# Patient Record
Sex: MALE | Race: WHITE | URBAN - METROPOLITAN AREA
[De-identification: names, ages, dates, MRNs, and addresses within clinical notes are randomized per-mention and may not be internally consistent; named-entity substitution may affect disease eponyms.]

---

## 2022-03-18 PROBLEM — D75.1 ACQUIRED POLYCYTHEMIA: Status: ACTIVE | Noted: 2021-09-07

## 2022-03-18 PROBLEM — I21.4 NSTEMI (NON-ST ELEVATED MYOCARDIAL INFARCTION) (HCC): Status: ACTIVE | Noted: 2021-09-07

## 2022-03-19 PROBLEM — I25.10 CORONARY ARTERY DISEASE INVOLVING NATIVE CORONARY ARTERY OF NATIVE HEART WITHOUT ANGINA PECTORIS: Status: ACTIVE | Noted: 2021-09-16

## 2022-03-19 PROBLEM — F41.1 GENERALIZED ANXIETY DISORDER: Status: ACTIVE | Noted: 2017-02-22

## 2022-03-19 PROBLEM — K21.9 GERD (GASTROESOPHAGEAL REFLUX DISEASE): Status: ACTIVE | Noted: 2018-07-01

## 2022-03-20 PROBLEM — N52.1 ERECTILE DYSFUNCTION DUE TO DISEASES CLASSIFIED ELSEWHERE: Status: ACTIVE | Noted: 2020-04-27

## 2022-05-04 PROBLEM — N52.8 OTHER MALE ERECTILE DYSFUNCTION: Status: ACTIVE | Noted: 2020-04-27

## 2024-09-09 ENCOUNTER — OFFICE VISIT (OUTPATIENT)
Age: 59
End: 2024-09-09
Payer: COMMERCIAL

## 2024-09-09 VITALS
DIASTOLIC BLOOD PRESSURE: 88 MMHG | HEART RATE: 49 BPM | HEIGHT: 70 IN | BODY MASS INDEX: 27.2 KG/M2 | SYSTOLIC BLOOD PRESSURE: 110 MMHG | WEIGHT: 190 LBS

## 2024-09-09 DIAGNOSIS — Z76.89 ENCOUNTER TO ESTABLISH CARE: Primary | ICD-10-CM

## 2024-09-09 DIAGNOSIS — I25.10 CORONARY ARTERY DISEASE INVOLVING NATIVE CORONARY ARTERY OF NATIVE HEART WITHOUT ANGINA PECTORIS: ICD-10-CM

## 2024-09-09 DIAGNOSIS — R73.03 PREDIABETES: ICD-10-CM

## 2024-09-09 DIAGNOSIS — I25.10 ASCVD (ARTERIOSCLEROTIC CARDIOVASCULAR DISEASE): ICD-10-CM

## 2024-09-09 DIAGNOSIS — E78.00 PURE HYPERCHOLESTEROLEMIA: ICD-10-CM

## 2024-09-09 PROCEDURE — 93000 ELECTROCARDIOGRAM COMPLETE: CPT | Performed by: INTERNAL MEDICINE

## 2024-09-09 PROCEDURE — 99204 OFFICE O/P NEW MOD 45 MIN: CPT | Performed by: INTERNAL MEDICINE

## 2024-09-09 RX ORDER — SILDENAFIL 100 MG/1
TABLET, FILM COATED ORAL
COMMUNITY
End: 2024-09-09 | Stop reason: SDUPTHER

## 2024-09-09 RX ORDER — FAMCICLOVIR 250 MG/1
1 TABLET ORAL 2 TIMES DAILY
COMMUNITY
Start: 2023-04-26

## 2024-09-09 RX ORDER — ALPRAZOLAM 0.5 MG
0.5 TABLET ORAL NIGHTLY PRN
COMMUNITY

## 2024-09-09 RX ORDER — SILDENAFIL 100 MG/1
100 TABLET, FILM COATED ORAL PRN
Qty: 30 TABLET | Refills: 5 | Status: SHIPPED | OUTPATIENT
Start: 2024-09-09

## 2024-09-09 RX ORDER — ASPIRIN 81 MG/1
81 TABLET, CHEWABLE ORAL EVERY OTHER DAY
COMMUNITY
Start: 2021-09-10

## 2024-09-09 RX ORDER — METOPROLOL SUCCINATE 25 MG/1
25 TABLET, EXTENDED RELEASE ORAL 2 TIMES DAILY
Qty: 90 TABLET | Refills: 3 | Status: SHIPPED | OUTPATIENT
Start: 2024-09-09 | End: 2024-09-09 | Stop reason: SDUPTHER

## 2024-09-09 RX ORDER — CLOPIDOGREL BISULFATE 75 MG/1
75 TABLET ORAL DAILY
COMMUNITY

## 2024-09-09 RX ORDER — ATORVASTATIN CALCIUM 80 MG/1
80 TABLET, FILM COATED ORAL DAILY
COMMUNITY
End: 2024-09-09 | Stop reason: SDUPTHER

## 2024-09-09 RX ORDER — METOPROLOL SUCCINATE 25 MG/1
25 TABLET, EXTENDED RELEASE ORAL 2 TIMES DAILY
Qty: 180 TABLET | Refills: 3 | Status: SHIPPED | OUTPATIENT
Start: 2024-09-09

## 2024-09-09 RX ORDER — ATORVASTATIN CALCIUM 80 MG/1
80 TABLET, FILM COATED ORAL DAILY
Qty: 90 TABLET | Refills: 3 | Status: SHIPPED | OUTPATIENT
Start: 2024-09-09

## 2024-09-09 RX ORDER — METOPROLOL SUCCINATE 25 MG/1
25 TABLET, EXTENDED RELEASE ORAL 2 TIMES DAILY
COMMUNITY
End: 2024-09-09 | Stop reason: SDUPTHER

## 2024-09-09 ASSESSMENT — ENCOUNTER SYMPTOMS
EYE PAIN: 0
ABDOMINAL PAIN: 0
NAIL CHANGES: 0
APHONIA: 0
STRIDOR: 0
COUGH: 0

## 2024-09-10 DIAGNOSIS — I25.10 CORONARY ARTERY DISEASE INVOLVING NATIVE CORONARY ARTERY OF NATIVE HEART WITHOUT ANGINA PECTORIS: ICD-10-CM

## 2024-09-10 DIAGNOSIS — R73.03 PREDIABETES: ICD-10-CM

## 2024-09-10 LAB
CHOLEST SERPL-MCNC: 139 MG/DL (ref 0–200)
EST. AVERAGE GLUCOSE BLD GHB EST-MCNC: 134 MG/DL
HBA1C MFR BLD: 6.3 % (ref 0–5.6)
HDLC SERPL-MCNC: 41 MG/DL (ref 40–60)
HDLC SERPL: 3.4 (ref 0–5)
LDLC SERPL CALC-MCNC: 76 MG/DL (ref 0–100)
TRIGL SERPL-MCNC: 110 MG/DL (ref 0–150)
VLDLC SERPL CALC-MCNC: 22 MG/DL (ref 6–23)

## 2024-09-11 LAB — LPA SERPL-SCNC: 66.3 NMOL/L

## 2025-01-20 ENCOUNTER — OFFICE VISIT (OUTPATIENT)
Dept: UROLOGY | Age: 60
End: 2025-01-20
Payer: COMMERCIAL

## 2025-01-20 DIAGNOSIS — Z12.5 ENCOUNTER FOR SCREENING FOR MALIGNANT NEOPLASM OF PROSTATE: Primary | ICD-10-CM

## 2025-01-20 DIAGNOSIS — R35.1 NOCTURIA: ICD-10-CM

## 2025-01-20 DIAGNOSIS — Z12.5 ENCOUNTER FOR SCREENING FOR MALIGNANT NEOPLASM OF PROSTATE: ICD-10-CM

## 2025-01-20 LAB
BILIRUBIN, URINE, POC: NEGATIVE
BLOOD URINE, POC: NEGATIVE
GLUCOSE URINE, POC: NEGATIVE MG/DL
KETONES, URINE, POC: NEGATIVE MG/DL
LEUKOCYTE ESTERASE, URINE, POC: NEGATIVE
NITRITE, URINE, POC: NEGATIVE
PH, URINE, POC: 5.5 (ref 4.6–8)
PROTEIN,URINE, POC: NEGATIVE MG/DL
PVR, POC: 9 CC
SPECIFIC GRAVITY, URINE, POC: 1.01 (ref 1–1.03)
URINALYSIS CLARITY, POC: NORMAL
URINALYSIS COLOR, POC: NORMAL
UROBILINOGEN, POC: NORMAL MG/DL

## 2025-01-20 PROCEDURE — 99204 OFFICE O/P NEW MOD 45 MIN: CPT | Performed by: UROLOGY

## 2025-01-20 PROCEDURE — 51798 US URINE CAPACITY MEASURE: CPT | Performed by: UROLOGY

## 2025-01-20 PROCEDURE — 81003 URINALYSIS AUTO W/O SCOPE: CPT | Performed by: UROLOGY

## 2025-01-20 RX ORDER — LORATADINE 10 MG/1
10 TABLET ORAL DAILY PRN
COMMUNITY

## 2025-01-20 RX ORDER — DEXLANSOPRAZOLE 60 MG/1
CAPSULE, DELAYED RELEASE ORAL
COMMUNITY

## 2025-01-20 ASSESSMENT — ENCOUNTER SYMPTOMS
EYE PAIN: 0
SKIN LESIONS: 0
HEARTBURN: 0
DIARRHEA: 0
COUGH: 0
INDIGESTION: 0
EYE DISCHARGE: 0
BACK PAIN: 0
NAUSEA: 0
WHEEZING: 0
CONSTIPATION: 0
VOMITING: 0
ABDOMINAL PAIN: 0
SHORTNESS OF BREATH: 0
BLOOD IN STOOL: 0

## 2025-01-20 NOTE — PROGRESS NOTES
[facility]  [address] [place] [address]  [phone]          Davidson Huntley  : 1965    Chief Complaint   Patient presents with    New Patient    Encounter for screening for Malignant Neoplasm of Prostate          HPI     Davidson Huntley is a 59 y.o. male    PSA 1.2024  PSA 0.8 2023    The patient is concerned about his PSA having risen over 18 months or so.  He has some mild voiding symptoms for which he desires no therapy.  There is no family history of prostate cancer.  He does tell me that his previous urologist in [place] told him he had a large benign feeling prostate.    Past Medical History:   Diagnosis Date    CAD (coronary artery disease) -    Stent placed    Elevated PSA 09/15/24    .89 (2023) - 1.5 (2024)    High cholesterol      Past Surgical History:   Procedure Laterality Date    CORONARY ANGIOPLASTY WITH STENT PLACEMENT      HERNIA REPAIR  2023    Bilateral inguinal hernia    ROTATOR CUFF REPAIR       Current Outpatient Medications   Medication Sig Dispense Refill    dexlansoprazole (DEXILANT) 60 MG CPDR delayed release capsule 1 capsule 1/2 to 1 hour before morning meal Orally Once a day for 90 days      loratadine (CLARITIN) 10 MG tablet Take 1 tablet by mouth daily as needed      aspirin 81 MG chewable tablet Take 1 tablet by mouth every other day      clopidogrel (PLAVIX) 75 MG tablet Take 1 tablet by mouth daily      famciclovir (FAMVIR) 250 MG tablet Take 1 tablet by mouth 2 times daily      ALPRAZolam (XANAX) 0.5 MG tablet Take 1 tablet by mouth nightly as needed for Sleep.      atorvastatin (LIPITOR) 80 MG tablet Take 1 tablet by mouth daily 90 tablet 3    sildenafil (VIAGRA) 100 MG tablet Take 1 tablet by mouth as needed for Erectile Dysfunction 30 tablet 5    metoprolol succinate (TOPROL XL) 25 MG extended release tablet Take 1 tablet by mouth in the morning and at bedtime 180 tablet 3     No current

## 2025-01-22 LAB — PSA SERPL DL<=0.01 NG/ML-MCNC: 1.18 NG/ML (ref 0–4)

## 2025-03-24 ENCOUNTER — OFFICE VISIT (OUTPATIENT)
Age: 60
End: 2025-03-24
Payer: COMMERCIAL

## 2025-03-24 VITALS
HEIGHT: 70 IN | SYSTOLIC BLOOD PRESSURE: 114 MMHG | DIASTOLIC BLOOD PRESSURE: 72 MMHG | BODY MASS INDEX: 27.63 KG/M2 | WEIGHT: 193 LBS | HEART RATE: 50 BPM

## 2025-03-24 DIAGNOSIS — N52.8 OTHER MALE ERECTILE DYSFUNCTION: ICD-10-CM

## 2025-03-24 DIAGNOSIS — E78.5 HYPERLIPIDEMIA LDL GOAL <70: ICD-10-CM

## 2025-03-24 DIAGNOSIS — I25.10 ASCVD (ARTERIOSCLEROTIC CARDIOVASCULAR DISEASE): Primary | ICD-10-CM

## 2025-03-24 PROCEDURE — 99214 OFFICE O/P EST MOD 30 MIN: CPT | Performed by: INTERNAL MEDICINE

## 2025-03-24 ASSESSMENT — ENCOUNTER SYMPTOMS
ABDOMINAL PAIN: 0
EYE PAIN: 0
COUGH: 0
STRIDOR: 0
APHONIA: 0
NAIL CHANGES: 0

## 2025-03-24 NOTE — PROGRESS NOTES
[address], [address]  [place] [address]  PHONE: [phone]    SUBJECTIVE:   Davidson Huntley is a 59 y.o. male 1965   seen for a follow up visit regarding the following:     Chief Complaint   Patient presents with    Coronary Artery Disease    Hyperlipidemia         History of Present Illness  The patient is a 59-year-old male presenting for management of hyperlipidemia, erectile dysfunction, and medication review.    The patient maintains an active lifestyle, engaging in daily cardiovascular exercises and weight training, though he has reduced his regimen to four days per week due to occupational demands. He denies experiencing chest pain or dyspnea. Recent laboratory results dated 02/27/2025 indicate an LDL level of 67 mg/dL, triglycerides at 99 mg/dL, total cholesterol at 134 mg/dL, and an HbA1c of 6.0%, improved from a previous value of 6.3%. The patient attributes this improvement to dietary modifications following a period of dehydration and poor nutrition during a relocation.    The patient expresses concern regarding a decrease in estimated glomerular filtration rate (eGFR) from 82 to 70 mL/min/1.73m², potentially associated with the use of Dexilant. He has since discontinued Dexilant and plans to follow up with his primary care physician in two months.    The patient has ceased taking baby aspirin, which was previously reduced from twice daily to once daily, and is uncertain about its impact on renal function. His current medications include Plavix, metoprolol, and atorvastatin.    He reports a positive response to sildenafil (Viagra).    The patient has lost seven pounds, which he attributes to dietary changes and increased water intake. He notes a disruption in his circadian rhythm due to working 1200-hour night shifts.    PAST SURGICAL HISTORY:  The patient underwent a colonoscopy last year.    SOCIAL HISTORY  - Occupation: Security at [employer]  -

## 2025-06-30 RX ORDER — ATORVASTATIN CALCIUM 80 MG/1
80 TABLET, FILM COATED ORAL DAILY
Qty: 90 TABLET | Refills: 3 | Status: SHIPPED | OUTPATIENT
Start: 2025-06-30

## 2025-06-30 NOTE — TELEPHONE ENCOUNTER
Last office visit 3/24/2025.  Rx verified in last office note.    Requested Prescriptions     Pending Prescriptions Disp Refills    atorvastatin (LIPITOR) 80 MG tablet 90 tablet 3     Sig: Take 1 tablet by mouth daily

## 2025-06-30 NOTE — TELEPHONE ENCOUNTER
MEDICATION REFILL REQUEST      Name of Medication:  Atorvastatin   Dose:  80 mg  Frequency:  daily   Quantity:    Days' supply:  90      Pharmacy Name/Location:  [facility] [address]